# Patient Record
Sex: MALE | Race: BLACK OR AFRICAN AMERICAN | NOT HISPANIC OR LATINO | Employment: FULL TIME | URBAN - METROPOLITAN AREA
[De-identification: names, ages, dates, MRNs, and addresses within clinical notes are randomized per-mention and may not be internally consistent; named-entity substitution may affect disease eponyms.]

---

## 2018-07-10 ENCOUNTER — OFFICE VISIT (OUTPATIENT)
Dept: INTERPRETER SERVICES | Facility: CLINIC | Age: 33
End: 2018-07-10

## 2018-07-10 ENCOUNTER — HOSPITAL ENCOUNTER (OUTPATIENT)
Dept: GENERAL RADIOLOGY | Facility: CLINIC | Age: 33
Discharge: HOME | End: 2018-07-10
Attending: PHYSICIAN ASSISTANT | Admitting: PHYSICIAN ASSISTANT

## 2018-07-10 DIAGNOSIS — M25.512 ACUTE PAIN OF LEFT SHOULDER: ICD-10-CM

## 2018-07-10 PROCEDURE — T1013 SIGN LANG/ORAL INTERPRETER: HCPCS | Mod: U3

## 2018-07-10 PROCEDURE — 73030 X-RAY EXAM OF SHOULDER: CPT | Mod: LT

## 2018-07-24 ENCOUNTER — THERAPY VISIT (OUTPATIENT)
Dept: PHYSICAL THERAPY | Facility: CLINIC | Age: 33
End: 2018-07-24

## 2018-07-24 DIAGNOSIS — M25.512 LEFT SHOULDER PAIN: Primary | ICD-10-CM

## 2018-07-24 PROCEDURE — 97161 PT EVAL LOW COMPLEX 20 MIN: CPT | Mod: GP | Performed by: PHYSICAL THERAPIST

## 2018-07-24 PROCEDURE — 97110 THERAPEUTIC EXERCISES: CPT | Mod: GP | Performed by: PHYSICAL THERAPIST

## 2018-07-24 PROCEDURE — 97530 THERAPEUTIC ACTIVITIES: CPT | Mod: GP | Performed by: PHYSICAL THERAPIST

## 2018-07-24 NOTE — MR AVS SNAPSHOT
"              After Visit Summary   7/24/2018    Ceci Frazier    MRN: [id number]           Patient Information     Date Of Birth          1985        Visit Information        Provider Department      7/24/2018 1:15 PM Yumiko Shields PT; [facility]        Today's Diagnoses     Left shoulder pain    -  1       Follow-ups after your visit        Your next 10 appointments already scheduled     Jul 30, 2018 11:15 AM CDT   VADIM Extremity with Ash Magana PT   [facility] ([address] [phone]              Who to contact     If you have questions or need follow up information about today's clinic visit or your schedule please contact [facility], [place] directly at [phone].  Normal or non-critical lab and imaging results will be communicated to you by Zapyahart, letter or phone within 4 business days after the clinic has received the results. If you do not hear from us within 7 days, please contact the clinic through Zapyahart or phone. If you have a critical or abnormal lab result, we will notify you by phone as soon as possible.  Submit refill requests through Mems-ID or call your pharmacy and they will forward the refill request to us. Please allow 3 business days for your refill to be completed.          Additional Information About Your Visit        Zapyahart Information     Mems-ID lets you send messages to your doctor, view your test results, renew your prescriptions, schedule appointments and more. To sign up, go to [email or web address] . Click on \"Log in\" on the left side of the screen, which will take you to the Welcome page. Then click on \"Sign up Now\" on the right side of the page.     You will be asked to enter the access code listed below, as well as some personal information. Please follow the directions to create your username " and password.     Your access code is: [email or web address]  Expires: 10/8/2018  3:24 PM     Your access code will  in 90 days. If you need help or a new code, please call your [facility] clinic or [phone].        Care EveryWhere ID     This is your Care EveryWhere ID. This could be used by other organizations to access your [facility] medical records  [id number]         Blood Pressure from Last 3 Encounters:   No data found for BP    Weight from Last 3 Encounters:   No data found for Wt              We Performed the Following     HC PT EVAL, LOW COMPLEXITY     VADIM INITIAL EVAL REPORT     THERAPEUTIC ACTIVITIES     THERAPEUTIC EXERCISES        Primary Care Provider Fax #    Physician No Ref-Primary [phone]       No address on file        Equal Access to Services     ALY KENNEY : Hadii clemencia alcantaro Sara, waaxda luqadaha, qaybta kaalmada adeegyada, go andre . So [facility] [phone].    ATENCIÓN: Si habla español, tiene a martinez disposición servicios gratuitos de asistencia lingüística. Llame al [phone].    We comply with applicable federal civil rights laws and [place] laws. We do not discriminate on the basis of race, color, national origin, age, disability, sex, sexual orientation, or gender identity.            Thank you!     Thank you for choosing [facility] [place]  for your care. Our goal is always to provide you with excellent care. Hearing back from our patients is one way we can continue to improve our services. Please take a few minutes to complete the written survey that you may receive in the mail after your visit with us. Thank you!             Your Updated Medication List - Protect others around you: Learn how to safely use, store and throw away your medicines at www.disposemymeds.org.      Notice  As of 2018 11:59 PM    You have not been prescribed any medications.

## 2018-07-25 PROBLEM — M25.512 LEFT SHOULDER PAIN: Status: ACTIVE | Noted: 2018-07-25

## 2024-06-29 ENCOUNTER — APPOINTMENT (OUTPATIENT)
Dept: GENERAL RADIOLOGY | Facility: CLINIC | Age: 39
End: 2024-06-29

## 2024-06-29 ENCOUNTER — HOSPITAL ENCOUNTER (EMERGENCY)
Facility: CLINIC | Age: 39
Discharge: HOME | End: 2024-06-29
Attending: EMERGENCY MEDICINE | Admitting: EMERGENCY MEDICINE

## 2024-06-29 VITALS
DIASTOLIC BLOOD PRESSURE: 93 MMHG | OXYGEN SATURATION: 100 % | SYSTOLIC BLOOD PRESSURE: 146 MMHG | HEART RATE: 94 BPM | TEMPERATURE: 97.5 F

## 2024-06-29 DIAGNOSIS — R09.A2 FOREIGN BODY SENSATION IN THROAT: ICD-10-CM

## 2024-06-29 DIAGNOSIS — R10.13 EPIGASTRIC DISCOMFORT: ICD-10-CM

## 2024-06-29 DIAGNOSIS — R00.2 PALPITATIONS: ICD-10-CM

## 2024-06-29 LAB
ALBUMIN SERPL BCG-MCNC: 4.1 G/DL (ref 3.5–5.2)
ALP SERPL-CCNC: 115 U/L (ref 40–150)
ALT SERPL W P-5'-P-CCNC: 30 U/L (ref 0–70)
ANION GAP SERPL CALCULATED.3IONS-SCNC: 10 MMOL/L (ref 7–15)
AST SERPL W P-5'-P-CCNC: 20 U/L (ref 0–45)
ATRIAL RATE - MUSE: 83 BPM
BASOPHILS # BLD AUTO: 0.1 10E3/UL (ref 0–0.2)
BASOPHILS NFR BLD AUTO: 1 %
BILIRUB SERPL-MCNC: 0.4 MG/DL
BUN SERPL-MCNC: 9 MG/DL (ref 6–20)
CALCIUM SERPL-MCNC: 9 MG/DL (ref 8.6–10)
CHLORIDE SERPL-SCNC: 102 MMOL/L (ref 98–107)
CREAT SERPL-MCNC: 0.72 MG/DL (ref 0.67–1.17)
D DIMER PPP FEU-MCNC: <0.27 UG/ML FEU (ref 0–0.5)
DEPRECATED HCO3 PLAS-SCNC: 24 MMOL/L (ref 22–29)
DIASTOLIC BLOOD PRESSURE - MUSE: NORMAL MMHG
EGFRCR SERPLBLD CKD-EPI 2021: >90 ML/MIN/1.73M2
EOSINOPHIL # BLD AUTO: 0.4 10E3/UL (ref 0–0.7)
EOSINOPHIL NFR BLD AUTO: 6 %
ERYTHROCYTE [DISTWIDTH] IN BLOOD BY AUTOMATED COUNT: 16 % (ref 10–15)
GLUCOSE SERPL-MCNC: 270 MG/DL (ref 70–99)
GROUP A STREP BY PCR: NOT DETECTED
HCT VFR BLD AUTO: 37.2 % (ref 40–53)
HGB BLD-MCNC: 11.8 G/DL (ref 13.3–17.7)
HOLD SPECIMEN: NORMAL
IMM GRANULOCYTES # BLD: 0 10E3/UL
IMM GRANULOCYTES NFR BLD: 0 %
INTERPRETATION ECG - MUSE: NORMAL
LIPASE SERPL-CCNC: 43 U/L (ref 13–60)
LYMPHOCYTES # BLD AUTO: 1.5 10E3/UL (ref 0.8–5.3)
LYMPHOCYTES NFR BLD AUTO: 23 %
MCH RBC QN AUTO: 23.9 PG (ref 26.5–33)
MCHC RBC AUTO-ENTMCNC: 31.7 G/DL (ref 31.5–36.5)
MCV RBC AUTO: 76 FL (ref 78–100)
MONOCYTES # BLD AUTO: 0.5 10E3/UL (ref 0–1.3)
MONOCYTES NFR BLD AUTO: 7 %
NEUTROPHILS # BLD AUTO: 4 10E3/UL (ref 1.6–8.3)
NEUTROPHILS NFR BLD AUTO: 63 %
NRBC # BLD AUTO: 0 10E3/UL
NRBC BLD AUTO-RTO: 0 /100
P AXIS - MUSE: 73 DEGREES
PLATELET # BLD AUTO: 326 10E3/UL (ref 150–450)
POTASSIUM SERPL-SCNC: 3.7 MMOL/L (ref 3.4–5.3)
PR INTERVAL - MUSE: 156 MS
PROT SERPL-MCNC: 7 G/DL (ref 6.4–8.3)
QRS DURATION - MUSE: 88 MS
QT - MUSE: 372 MS
QTC - MUSE: 437 MS
R AXIS - MUSE: 73 DEGREES
RBC # BLD AUTO: 4.93 10E6/UL (ref 4.4–5.9)
SARS-COV-2 RNA RESP QL NAA+PROBE: NEGATIVE
SODIUM SERPL-SCNC: 136 MMOL/L (ref 135–145)
SYSTOLIC BLOOD PRESSURE - MUSE: NORMAL MMHG
T AXIS - MUSE: 229 DEGREES
TROPONIN T SERPL HS-MCNC: <6 NG/L
TROPONIN T SERPL HS-MCNC: <6 NG/L
TSH SERPL DL<=0.005 MIU/L-ACNC: 1.94 UIU/ML (ref 0.3–4.2)
VENTRICULAR RATE- MUSE: 83 BPM
WBC # BLD AUTO: 6.4 10E3/UL (ref 4–11)

## 2024-06-29 PROCEDURE — 87635 SARS-COV-2 COVID-19 AMP PRB: CPT | Performed by: EMERGENCY MEDICINE

## 2024-06-29 PROCEDURE — 84484 ASSAY OF TROPONIN QUANT: CPT

## 2024-06-29 PROCEDURE — 99285 EMERGENCY DEPT VISIT HI MDM: CPT | Performed by: EMERGENCY MEDICINE

## 2024-06-29 PROCEDURE — 85379 FIBRIN DEGRADATION QUANT: CPT | Performed by: EMERGENCY MEDICINE

## 2024-06-29 PROCEDURE — 84443 ASSAY THYROID STIM HORMONE: CPT | Performed by: EMERGENCY MEDICINE

## 2024-06-29 PROCEDURE — 93005 ELECTROCARDIOGRAM TRACING: CPT | Mod: RTG

## 2024-06-29 PROCEDURE — 80053 COMPREHEN METABOLIC PANEL: CPT | Performed by: EMERGENCY MEDICINE

## 2024-06-29 PROCEDURE — 71046 X-RAY EXAM CHEST 2 VIEWS: CPT

## 2024-06-29 PROCEDURE — 84484 ASSAY OF TROPONIN QUANT: CPT | Performed by: EMERGENCY MEDICINE

## 2024-06-29 PROCEDURE — 36415 COLL VENOUS BLD VENIPUNCTURE: CPT

## 2024-06-29 PROCEDURE — 93005 ELECTROCARDIOGRAM TRACING: CPT | Performed by: EMERGENCY MEDICINE

## 2024-06-29 PROCEDURE — 36415 COLL VENOUS BLD VENIPUNCTURE: CPT | Performed by: EMERGENCY MEDICINE

## 2024-06-29 PROCEDURE — 85025 COMPLETE CBC W/AUTO DIFF WBC: CPT | Performed by: EMERGENCY MEDICINE

## 2024-06-29 PROCEDURE — 99284 EMERGENCY DEPT VISIT MOD MDM: CPT | Mod: FS | Performed by: EMERGENCY MEDICINE

## 2024-06-29 PROCEDURE — 93010 ELECTROCARDIOGRAM REPORT: CPT | Performed by: EMERGENCY MEDICINE

## 2024-06-29 PROCEDURE — 250N000013 HC RX MED GY IP 250 OP 250 PS 637

## 2024-06-29 PROCEDURE — 87651 STREP A DNA AMP PROBE: CPT | Performed by: EMERGENCY MEDICINE

## 2024-06-29 PROCEDURE — 83690 ASSAY OF LIPASE: CPT | Performed by: EMERGENCY MEDICINE

## 2024-06-29 RX ORDER — MAGNESIUM HYDROXIDE/ALUMINUM HYDROXICE/SIMETHICONE 120; 1200; 1200 MG/30ML; MG/30ML; MG/30ML
15 SUSPENSION ORAL ONCE
Status: COMPLETED | OUTPATIENT
Start: 2024-06-29 | End: 2024-06-29

## 2024-06-29 RX ADMIN — ALUMINUM HYDROXIDE, MAGNESIUM HYDROXIDE, AND SIMETHICONE 15 ML: 1200; 120; 1200 SUSPENSION ORAL at 14:17

## 2024-06-29 ASSESSMENT — ACTIVITIES OF DAILY LIVING (ADL)
ADLS_ACUITY_SCORE: 35

## 2024-06-29 ASSESSMENT — COLUMBIA-SUICIDE SEVERITY RATING SCALE - C-SSRS
6. HAVE YOU EVER DONE ANYTHING, STARTED TO DO ANYTHING, OR PREPARED TO DO ANYTHING TO END YOUR LIFE?: NO
1. IN THE PAST MONTH, HAVE YOU WISHED YOU WERE DEAD OR WISHED YOU COULD GO TO SLEEP AND NOT WAKE UP?: NO
2. HAVE YOU ACTUALLY HAD ANY THOUGHTS OF KILLING YOURSELF IN THE PAST MONTH?: NO

## 2024-06-29 NOTE — DISCHARGE INSTRUCTIONS
Continue/begin omeprazole for gastric reflux symptoms; recommend taking your first dose of the day 20 to 30 minutes prior to your first meal as to help reduce acid production  Continue plenty of fluids and diet as tolerated with soft bland foods in the setting of your gastric reflux and foreign body sensation in your throat  Follow-up with the PCP office when they call to schedule your appointment next week for reevaluation of your symptoms that brought you into the ED today  Follow-up with ENT when they call you to schedule your appointment to further discuss your throat and neck symptoms  Otherwise, do not hesitate to return to the ED if you have any worsening or concerning signs or symptoms

## 2024-06-29 NOTE — ED TRIAGE NOTES
"Chest Pain Pain in \"heart\" started yesterday.   Shortness of Breath Started today   Patient states he was using a product on his fair for hair loss yesterday and started having chest pain today.  Patient states its midsternal radiating up to his tonsils.  Patient describes the pain as a \"dropping feeling\" like something is falling down inside of him  Patient is unsure what his allergies are.       Triage Assessment (Adult)       Row Name 06/29/24 1059          Cardiac WDL    Cardiac WDL X;chest pain        Chest Pain Assessment    Chest Pain Location midsternal     Chest Pain Radiation neck     Character other (see comments)  patient states his heart is pounding and feels like something is falling down in his chest     Precipitating Factors other (see comments)  patient reports takng a medication for hair growth on his head and thats when his symptoms started        Peripheral/Neurovascular WDL    Peripheral Neurovascular WDL WDL        Cognitive/Neuro/Behavioral WDL    Cognitive/Neuro/Behavioral WDL WDL                     "

## 2024-06-29 NOTE — ED PROVIDER NOTES
"    [facility] [place] ([facility])    6/29/24      ED PROVIDER NOTE       History     Chief Complaint   Patient presents with    Chest Pain     Pain in \"heart\" started yesterday.    Shortness of Breath     Started today     The history is provided by the patient and medical records. The history is limited by a language barrier. A  was used (Kittitian).     Ceci Frazier is a 39 year old male who presents to the ED for evaluation of chest palpitations and abdominal pain.  Past medical history notable for GERD and gastritis. Patient states that he was using a product yesterday for hair loss when soon after using the product, he began feeling his heart beat faster than normal and became very sweaty.  He states that he sat in front of the air conditioner after the symptoms appeared and he felt better after a couple of minutes of rest.  He states that this was the only episode that he had and has not had a recurrent episode or myriad of symptoms similar to this today.  This was clarified several times throughout the initial ED provider visit for which she stated that he is not having any chest symptoms currently. He states this was the first time he had used this product.  He shows me a photo of the product on his phone and it is a biotin/minoxidil liquid that he ordered from [place].  He describes sensation as \"a drop in feeling\" like something is falling down inside of him and feeling as though his heart was beating faster than normal.  He denies any shortness of air, recent cough, rhinorrhea, congestion, ear pain, or sore throat while swallowing.  The throat symptoms that he described initially during triage were clarified and he states that he often has a feeling of a foreign body in his throat and this has been present for several months.  He denies any pain when swallowing or inability to swallow or handle his oral secretions.  He denies any upper or lower extremity weakness, " numbness, or tingling but does report lower extremity burning sensation that has been present for almost a year.  He was able to ambulate into the emergency department and ED exam room without difficulties with ambulation.  He also reports abdominal discomfort that is new today but states he has had this feeling in the past due to known gastritis versus GERD.  He states he is on a daily medication for this but is unsure of what it is called stating that it is a capsule that is blue on one end and white on the other.  He locates it into his epigastric region without radiation into the rest of his abdomen.  He denies nausea, vomiting, fever, chills, constipation, diarrhea, melena, hematochezia, or urinary symptoms.  He denies known allergies.    According to chart review, he has been seen in the ER in the past for throat symptoms and burning sensation in his legs as he has reported the same symptoms today stating that they have been present for several months to years.    Past Medical History  No past medical history on file.  No past surgical history on file.  omeprazole (PRILOSEC) 20 MG DR capsule      Not on File  Family History  No family history on file.  Social History       A complete review of systems was performed with pertinent positives and negatives noted in the HPI, and all other systems negative.    Physical Exam   BP: (!) 146/93  Pulse: 94  Temp: 97.5  F (36.4  C)  SpO2: 100 %    Physical Exam  Constitutional:       General: He is not in acute distress.     Appearance: He is well-developed and normal weight. He is not ill-appearing, toxic-appearing or diaphoretic.   HENT:      Mouth/Throat:      Mouth: Mucous membranes are moist.      Pharynx: Oropharynx is clear. No posterior oropharyngeal erythema.   Cardiovascular:      Rate and Rhythm: Normal rate and regular rhythm. No extrasystoles are present.     Pulses:           Radial pulses are 2+ on the right side and 2+ on the left side.      Heart sounds:  Normal heart sounds. No murmur heard.  Pulmonary:      Effort: Pulmonary effort is normal. No respiratory distress.      Breath sounds: Normal breath sounds. No decreased breath sounds, wheezing, rhonchi or rales.   Abdominal:      General: Abdomen is flat. Bowel sounds are normal. There is no distension.      Palpations: Abdomen is soft.      Tenderness: There is abdominal tenderness in the epigastric area. There is no guarding or rebound.   Musculoskeletal:      Cervical back: Normal range of motion and neck supple.      Right lower leg: No tenderness. No edema.      Left lower leg: No tenderness. No edema.   Skin:     General: Skin is warm.      Findings: No erythema or rash.   Neurological:      General: No focal deficit present.      Mental Status: He is alert and oriented to person, place, and time.   Psychiatric:         Mood and Affect: Mood normal.         Behavior: Behavior normal.         ED Course, Procedures, & Data      Procedures            EKG Interpretation:      Interpreted by Viki Barksdale PA-C  Time reviewed: 1130  Symptoms at time of EKG: Epigastric pain, palpitations yesterday  Rhythm: normal sinus   Rate: Normal  Axis: Normal  Ectopy: none  Conduction: normal  ST Segments/ T Waves: Non-specific ST-T wave changes, T wave peaking aVR, T wave inversion I, II, III, aVF, V4, V5, and V6, and QTc 437  Q Waves: none  Comparison to prior: No image available to compare to but previous EKG report noted diffuse, nonspecific ST changes in the past    Clinical Impression: Nonspecific ST-T wave abnormalities, non-specific EKG             Results for orders placed or performed during the hospital encounter of 06/29/24   XR Chest 2 Views     Status: None    Narrative    EXAM: XR CHEST 2 VIEWS  LOCATION: [facility]  DATE: 06/29/2024    INDICATION: Palpitations.  COMPARISON: Shoulder x-ray on 07/18/2018.      Impression    IMPRESSION: PA and lateral views of the  chest were obtained. Cardiomediastinal silhouette is within normal limits. Nodular opacity projects over the left upper lobe, not significantly changed as compared to 07/10/2018 exam, likely represents calcified   granuloma. Otherwise, no suspicious focal pulmonary opacities. No significant pleural effusion or pneumothorax.     [place] Draw     Status: None    Narrative    The following orders were created for panel order [place] Draw.  Procedure                               Abnormality         Status                     ---------                               -----------         ------                     Extra Blue Top Tube[[id number]]                              Final result               Extra Red Top Tube[[id number]]                               Final result               Extra Green Top (Lithium...[[id number]]                      Final result               Extra Green Top (Lithium...[[id number]]                      Final result               Extra Purple Top Tube[[id number]]                            Final result                 Please view results for these tests on the individual orders.   Extra Blue Top Tube     Status: None   Result Value Ref Range    Hold Specimen JIC    Extra Red Top Tube     Status: None   Result Value Ref Range    Hold Specimen JIC    Extra Green Top (Lithium Heparin) Tube     Status: None   Result Value Ref Range    Hold Specimen JIC    Extra Green Top (Lithium Heparin) Tube     Status: None   Result Value Ref Range    Hold Specimen JIC    Extra Purple Top Tube     Status: None   Result Value Ref Range    Hold Specimen hold    Asymptomatic COVID-19 Virus (Coronavirus) by PCR Oropharynx     Status: Normal    Specimen: Oropharynx; Swab   Result Value Ref Range    SARS CoV2 PCR Negative Negative    Narrative    Testing was performed using the Xpert Xpress SARS-CoV-2 Assay on the Cepheid Gene-Xpert Instrument Systems. Additional information about this Emergency Use Authorization (EUA)  assay can be found via the Lab Guide. This test should be ordered for the detection of SARS-CoV-2 in individuals who meet SARS-CoV-2 clinical and/or epidemiological criteria as well as from individuals without symptoms or other reasons to suspect COVID-19. Test performance for asymptomatic patients has only been established in anterior nasal swab specimens. This test is for in vitro diagnostic use under the FDA EUA for laboratories certified under CLIA to perform high complexity testing. This test has not been FDA cleared or approved. A negative result does not rule out the presence of PCR inhibitors in the specimen or target RNA concentration below the limit of detection for the assay. The possibility of a false negative should be considered if the patient's recent exposure or clinical presentation suggests COVID-19. This test was validated by the [facility]. This laboratory is certified under the Clinical Laboratory Improvement Amendments (CLIA) as qualified to perform high complexity laboratory testing.     Comprehensive metabolic panel     Status: Abnormal   Result Value Ref Range    Sodium 136 135 - 145 mmol/L    Potassium 3.7 3.4 - 5.3 mmol/L    Carbon Dioxide (CO2) 24 22 - 29 mmol/L    Anion Gap 10 7 - 15 mmol/L    Urea Nitrogen 9.0 6.0 - 20.0 mg/dL    Creatinine 0.72 0.67 - 1.17 mg/dL    GFR Estimate >90 >60 mL/min/1.73m2    Calcium 9.0 8.6 - 10.0 mg/dL    Chloride 102 98 - 107 mmol/L    Glucose 270 (H) 70 - 99 mg/dL    Alkaline Phosphatase 115 40 - 150 U/L    AST 20 0 - 45 U/L    ALT 30 0 - 70 U/L    Protein Total 7.0 6.4 - 8.3 g/dL    Albumin 4.1 3.5 - 5.2 g/dL    Bilirubin Total 0.4 <=1.2 mg/dL   Lipase     Status: Normal   Result Value Ref Range    Lipase 43 13 - 60 U/L   Troponin T, High Sensitivity     Status: Normal   Result Value Ref Range    Troponin T, High Sensitivity <6 <=22 ng/L   D dimer quantitative     Status: Normal   Result Value Ref Range    D-Dimer  Quantitative <0.27 0.00 - 0.50 ug/mL FEU    Narrative    This D-dimer assay is intended for use in conjunction with a clinical pretest probability assessment model to exclude pulmonary embolism (PE) and deep venous thrombosis (DVT) in outpatients suspected of PE or DVT. The cut-off value is 0.50 ug/mL FEU.   TSH with free T4 reflex     Status: Normal   Result Value Ref Range    TSH 1.94 0.30 - 4.20 uIU/mL   CBC with platelets and differential     Status: Abnormal   Result Value Ref Range    WBC Count 6.4 4.0 - 11.0 10e3/uL    RBC Count 4.93 4.40 - 5.90 10e6/uL    Hemoglobin 11.8 (L) 13.3 - 17.7 g/dL    Hematocrit 37.2 (L) 40.0 - 53.0 %    MCV 76 (L) 78 - 100 fL    MCH 23.9 (L) 26.5 - 33.0 pg    MCHC 31.7 31.5 - 36.5 g/dL    RDW 16.0 (H) 10.0 - 15.0 %    Platelet Count 326 150 - 450 10e3/uL    % Neutrophils 63 %    % Lymphocytes 23 %    % Monocytes 7 %    % Eosinophils 6 %    % Basophils 1 %    % Immature Granulocytes 0 %    NRBCs per 100 WBC 0 <1 /100    Absolute Neutrophils 4.0 1.6 - 8.3 10e3/uL    Absolute Lymphocytes 1.5 0.8 - 5.3 10e3/uL    Absolute Monocytes 0.5 0.0 - 1.3 10e3/uL    Absolute Eosinophils 0.4 0.0 - 0.7 10e3/uL    Absolute Basophils 0.1 0.0 - 0.2 10e3/uL    Absolute Immature Granulocytes 0.0 <=0.4 10e3/uL    Absolute NRBCs 0.0 10e3/uL   Troponin T, High Sensitivity     Status: Normal   Result Value Ref Range    Troponin T, High Sensitivity <6 <=22 ng/L   EKG 12 lead     Status: None   Result Value Ref Range    Systolic Blood Pressure  mmHg    Diastolic Blood Pressure  mmHg    Ventricular Rate 83 BPM    Atrial Rate 83 BPM    SD Interval 156 ms    QRS Duration 88 ms     ms    QTc 437 ms    P Axis 73 degrees    R AXIS 73 degrees    T Axis 229 degrees    Interpretation ECG       Sinus rhythm  ST & T wave abnormality, consider inferolateral ischemia  Abnormal ECG  Unconfirmed report - interpretation of this ECG is computer generated - see medical record for final interpretation  Confirmed by -  EMERGENCY ROOM, PHYSICIAN ([id number]),  SCOTT MORAES ([id number]) on 6/29/2024 2:56:50 PM     Group A Streptococcus PCR Throat Swab     Status: Normal    Specimen: Throat; Swab   Result Value Ref Range    Group A strep by PCR Not Detected Not Detected    Narrative    The Xpert Xpress Strep A test, performed on the [employer]  Instrument Systems, is a rapid, qualitative in vitro diagnostic test for the detection of Streptococcus pyogenes (Group A ß-hemolytic Streptococcus, Strep A) in throat swab specimens from patients with signs and symptoms of pharyngitis. The Xpert Xpress Strep A test can be used as an aid in the diagnosis of Group A Streptococcal pharyngitis. The assay is not intended to monitor treatment for Group A Streptococcus infections. The Xpert Xpress Strep A test utilizes an automated real-time polymerase chain reaction (PCR) to detect Streptococcus pyogenes DNA.   CBC with platelets differential     Status: Abnormal    Narrative    The following orders were created for panel order CBC with platelets differential.  Procedure                               Abnormality         Status                     ---------                               -----------         ------                     CBC with platelets and d...[[id number]]  Abnormal            Final result                 Please view results for these tests on the individual orders.     Medications   alum & mag hydroxide-simethicone (MAALOX) suspension 15 mL (15 mLs Oral $Given 6/29/24 1214)     Labs Ordered and Resulted from Time of ED Arrival to Time of ED Departure   COMPREHENSIVE METABOLIC PANEL - Abnormal       Result Value    Sodium 136      Potassium 3.7      Carbon Dioxide (CO2) 24      Anion Gap 10      Urea Nitrogen 9.0      Creatinine 0.72      GFR Estimate >90      Calcium 9.0      Chloride 102      Glucose 270 (*)     Alkaline Phosphatase 115      AST 20      ALT 30      Protein Total 7.0      Albumin 4.1      Bilirubin Total 0.4     CBC  WITH PLATELETS AND DIFFERENTIAL - Abnormal    WBC Count 6.4      RBC Count 4.93      Hemoglobin 11.8 (*)     Hematocrit 37.2 (*)     MCV 76 (*)     MCH 23.9 (*)     MCHC 31.7      RDW 16.0 (*)     Platelet Count 326      % Neutrophils 63      % Lymphocytes 23      % Monocytes 7      % Eosinophils 6      % Basophils 1      % Immature Granulocytes 0      NRBCs per 100 WBC 0      Absolute Neutrophils 4.0      Absolute Lymphocytes 1.5      Absolute Monocytes 0.5      Absolute Eosinophils 0.4      Absolute Basophils 0.1      Absolute Immature Granulocytes 0.0      Absolute NRBCs 0.0     COVID-19 VIRUS (CORONAVIRUS) BY PCR - Normal    SARS CoV2 PCR Negative     LIPASE - Normal    Lipase 43     TROPONIN T, HIGH SENSITIVITY - Normal    Troponin T, High Sensitivity <6     D DIMER QUANTITATIVE - Normal    D-Dimer Quantitative <0.27     TSH WITH FREE T4 REFLEX - Normal    TSH 1.94     TROPONIN T, HIGH SENSITIVITY - Normal    Troponin T, High Sensitivity <6     GROUP A STREPTOCOCCUS PCR THROAT SWAB - Normal    Group A strep by PCR Not Detected       XR Chest 2 Views   Final Result   IMPRESSION: PA and lateral views of the chest were obtained. Cardiomediastinal silhouette is within normal limits. Nodular opacity projects over the left upper lobe, not significantly changed as compared to 07/10/2018 exam, likely represents calcified    granuloma. Otherwise, no suspicious focal pulmonary opacities. No significant pleural effusion or pneumothorax.                Critical care was not performed.     Medical Decision Making  The patient's presentation was of high complexity (an acute health issue posing potential threat to life or bodily function).    The patient's evaluation involved:  review of external note(s) from 1 sources (previous ED encounters with some similar complaints)  ordering and/or review of 3+ test(s) in this encounter (see separate area of note for details)    The patient's management necessitated moderate risk  (prescription drug management including medications given in the ED).    Assessment & Plan    Ceci is a 39-year-old male that presented to the ED with complaints of an episode of heart palpitations and diaphoresis yesterday with abdominal/epigastric discomfort today.  Acceptable vital signs on presentation without tachycardia, fever, hypotension, or concerning hypertensive urgency or emergency.  No hypoxia on room air.  Patient resting comfortably in the ED on initial evaluation and throughout the ED visit in no acute distress and otherwise nontoxic-appearing and hemodynamically stable.  Mild epigastric tenderness without acute abdomen signs of guarding, rebound, rigidity.  No overlying skin rash to the chest or abdomen area.  No tripod appearance, drooling, trismus, or noted airway compromise with respiratory distress on exam.  EKG notable for some T wave abnormalities without ability to compare to previous image but report from previous EKG also reported nonspecific ST segment and T wave abnormalities.  Initial troponin negative with 2-hour repeat negative as well.  Patient did not have any chest symptoms throughout his ED visit stating that he still episode occurred yesterday evening after utilizing the topical liquid to his head.  D-dimer negative.  COVID negative.  Group A strep negative.  Labs otherwise unremarkable and within normal limits.  Chest x-ray negative for any acute cardiopulmonary process, notable for an unchanged nodular opacity over the left upper lobe suspicious for calcified granuloma.  P.o. Maalox given in the ED for symptoms of GERD versus gastritis that is known to the patient in the setting of his epigastric discomfort.  Low suspicion ACS in the setting of negative symptoms while in the ED, reassuring continue cardiac monitoring and negative troponin x 2.  Discussed reassuring lab and imaging results with the patient at length as well as the recommendation for discharge home.  Strict  return precautions discussed at length and given the paperwork.  Referral for PCP office follow-up next week was sent from the ED today as patient has not establish care with any clinic at this time.  Referral for ENT was sent from the ED today as well to further discuss his foreign body sensation that he has had for years.  Rx omeprazole for GERD/gastritis.  Patient was agreeable to the discharge treatment plan, voiced understanding, and all questions were answered prior to discharge.    I have reviewed the nursing notes. I have reviewed the findings, diagnosis, plan and need for follow up with the patient.    Discharge Medication List as of 6/29/2024  2:57 PM        START taking these medications    Details   omeprazole (PRILOSEC) 20 MG DR capsule Take 1 capsule (20 mg) by mouth 2 times daily for 30 days, Disp-60 capsule, R-0, Local Print             Final diagnoses:   Palpitations   Foreign body sensation in throat   Epigastric discomfort     MISHEL Oleary MD  [facility] EMERGENCY DEPARTMENT  6/29/2024     Viki Barksdale PA-C  06/29/24 1517    --    ED Attending Physician Attestation    I America Eng MD, cared for this patient with the Advanced Practice Provider (ROGELIO). I personally provided a substantive portion of the care for this patient, including approving the care plan for the number and complexity of problems addressed and taking responsibility related to the risk of complications and/or morbidity or mortality of patient management. Please see the ROGELIO's documentation for full details.    Summary of HPI, PE, ED Course   Patient is a 39 year old male evaluated in the emergency department for chest pain, sob, throat soreness and swollen glands which he has had before. Exam and ED course notable for serial troponins which were negative, normal d dimer, negative cxr, negative covid and strep. Glucose 270.   Does not appear cardiac.  Throat issues are longstanding  will  refer to ent for throat. Pcp follow up for chest pain. After the completion of care in the emergency department, the patient was discharged.      America Eng MD  Emergency Medicine          America Eng MD  06/30/24 0974

## 2024-07-18 ENCOUNTER — NURSE TRIAGE (OUTPATIENT)
Dept: NURSING | Facility: CLINIC | Age: 39
End: 2024-07-18

## 2024-07-18 NOTE — TELEPHONE ENCOUNTER
"Nurse Triage SBAR    Is this a 2nd Level Triage? YES, LICENSED PRACTITIONER REVIEW IS REQUIRED    Situation: Patient feels that he swallowed a toothpick 6-7 years ago while drinking water.       Background: Referral for ENT was sent from the ED today as well to further discuss his foreign body sensation that he has had for years.     R09.A2 (ICD-10-CM) - Foreign body sensation in throat     Assessment:   Patient states that he has discomfort when sleeping when laying on his side as the \"Toothpick\" sensation moves,  gives a tingling sensation also.  \"Slippery sensation\"  \"When I touch the area by my tonsils and throat I feel it\"  Denies difficulties eating and drinking.  Food passes ok.  Denies SOB    Protocol Recommended Disposition:   Routing to ENT as pt has referral from ED    Recommendation:       Routed to provider      Reason for Disposition    [1] Swallowed FB > 3 days ago AND [2] FB hasn't passed in the stools AND [3] FB is of concern (e.g., sharp, large, valuable, etc.)    Answer Assessment - Initial Assessment Questions  1. OBJECT: \"What is it?\"   Referral in from ED while patient was seen there,   Referral for ENT was sent from the ED today as well to further discuss his foreign body sensation that he has had for years.     R09.A2 (ICD-10-CM) - Foreign body sensation in throat         Patient states that 6-7 years ago he drank some water with a toothpick in it.   Patient states that he has discomfort when sleeping when laying on his side as the \"Toothpick\" sensation moves,  gives a tingling sensation also.  \"Slippery sensation\"  \"When I touch the area by my tonsils and throat I feel it\"  Denies difficulties eating and drinking.  Food passes ok.  Denies SOB    2. SIZE: \"How large is it?\" (e.g., inches or cm; or compare it to coins)       toothpick  3. ONSET: \"How long ago did you swallow it?\" (e.g., minutes, hours)       6-7 years ago  4. HOW DID IT HAPPEN: \"Tell me how it happened.\"       Drinking water " "with toothpick in it  5. OTHER SYMPTOMS: \"Are there any other symptoms?\" (e.g., pain in neck or chest, difficulty breathing, difficulty swallowing)        6. PREGNANCY: \"Is there any chance you are pregnant?\" \"When was your last menstrual period?\"      N/a    Protocols used: Swallowed Foreign Body-A-AH    "

## 2024-07-22 ENCOUNTER — TELEPHONE (OUTPATIENT)
Dept: OTOLARYNGOLOGY | Facility: CLINIC | Age: 39
End: 2024-07-22

## 2024-07-22 NOTE — TELEPHONE ENCOUNTER
Cassius via  to schedule next available new ent with a general community ent provider for further discussion of his foreign body sensation that he has had for 6-7years from ED.    Laurie Carl on 7/22/2024 at 2:32 PM

## 2024-07-23 ENCOUNTER — APPOINTMENT (OUTPATIENT)
Dept: INTERPRETER SERVICES | Facility: CLINIC | Age: 39
End: 2024-07-23